# Patient Record
Sex: FEMALE | Race: BLACK OR AFRICAN AMERICAN | Employment: OTHER | URBAN - METROPOLITAN AREA
[De-identification: names, ages, dates, MRNs, and addresses within clinical notes are randomized per-mention and may not be internally consistent; named-entity substitution may affect disease eponyms.]

---

## 2020-12-14 PROBLEM — T84.84XA PAIN DUE TO INTERNAL ORTHOPEDIC PROSTHETIC DEVICE (HCC): Status: ACTIVE | Noted: 2020-12-14

## 2020-12-16 PROBLEM — I10 HTN (HYPERTENSION): Status: ACTIVE | Noted: 2020-12-16

## 2020-12-16 PROBLEM — N60.12: Status: ACTIVE | Noted: 2020-12-16

## 2020-12-16 PROBLEM — E11.9 TYPE 2 DIABETES MELLITUS (HCC): Status: ACTIVE | Noted: 2020-12-16

## 2020-12-16 PROBLEM — E78.5 HLD (HYPERLIPIDEMIA): Status: ACTIVE | Noted: 2020-12-16

## 2020-12-16 PROBLEM — J30.9 ALLERGIC RHINITIS: Status: ACTIVE | Noted: 2020-12-16

## 2020-12-16 PROBLEM — D62 ACUTE BLOOD LOSS ANEMIA: Status: ACTIVE | Noted: 2020-12-16

## 2021-03-13 ENCOUNTER — APPOINTMENT (OUTPATIENT)
Dept: VASCULAR SURGERY | Age: 81
End: 2021-03-13
Attending: PHYSICIAN ASSISTANT
Payer: MEDICARE

## 2021-03-13 ENCOUNTER — HOSPITAL ENCOUNTER (EMERGENCY)
Age: 81
Discharge: HOME | End: 2021-03-13
Attending: EMERGENCY MEDICINE
Payer: MEDICARE

## 2021-03-13 VITALS
RESPIRATION RATE: 20 BRPM | OXYGEN SATURATION: 100 % | SYSTOLIC BLOOD PRESSURE: 135 MMHG | TEMPERATURE: 98.4 F | HEART RATE: 77 BPM | DIASTOLIC BLOOD PRESSURE: 64 MMHG

## 2021-03-13 DIAGNOSIS — M66.0 RUPTURED BAKERS CYST: Primary | ICD-10-CM

## 2021-03-13 LAB
ANION GAP SERPL CALC-SCNC: 8 MMOL/L (ref 3–18)
BASOPHILS # BLD: 0 K/UL (ref 0–0.1)
BASOPHILS NFR BLD: 0 % (ref 0–2)
BUN SERPL-MCNC: 18 MG/DL (ref 7–18)
BUN/CREAT SERPL: 26 (ref 12–20)
CALCIUM SERPL-MCNC: 9.8 MG/DL (ref 8.5–10.1)
CHLORIDE SERPL-SCNC: 104 MMOL/L (ref 100–111)
CO2 SERPL-SCNC: 29 MMOL/L (ref 21–32)
CREAT SERPL-MCNC: 0.68 MG/DL (ref 0.6–1.3)
D DIMER PPP FEU-MCNC: 5.79 UG/ML(FEU)
DIFFERENTIAL METHOD BLD: ABNORMAL
EOSINOPHIL # BLD: 0.1 K/UL (ref 0–0.4)
EOSINOPHIL NFR BLD: 2 % (ref 0–5)
ERYTHROCYTE [DISTWIDTH] IN BLOOD BY AUTOMATED COUNT: 15.3 % (ref 11.6–14.5)
GLUCOSE SERPL-MCNC: 100 MG/DL (ref 74–99)
HCT VFR BLD AUTO: 38.2 % (ref 35–45)
HGB BLD-MCNC: 12.4 G/DL (ref 12–16)
LYMPHOCYTES # BLD: 1.4 K/UL (ref 0.9–3.6)
LYMPHOCYTES NFR BLD: 28 % (ref 21–52)
MCH RBC QN AUTO: 26.1 PG (ref 24–34)
MCHC RBC AUTO-ENTMCNC: 32.5 G/DL (ref 31–37)
MCV RBC AUTO: 80.3 FL (ref 74–97)
MONOCYTES # BLD: 0.6 K/UL (ref 0.05–1.2)
MONOCYTES NFR BLD: 12 % (ref 3–10)
NEUTS SEG # BLD: 2.9 K/UL (ref 1.8–8)
NEUTS SEG NFR BLD: 58 % (ref 40–73)
PLATELET # BLD AUTO: 174 K/UL (ref 135–420)
PMV BLD AUTO: 9.4 FL (ref 9.2–11.8)
POTASSIUM SERPL-SCNC: 3.9 MMOL/L (ref 3.5–5.5)
RBC # BLD AUTO: 4.76 M/UL (ref 4.2–5.3)
SODIUM SERPL-SCNC: 141 MMOL/L (ref 136–145)
WBC # BLD AUTO: 5 K/UL (ref 4.6–13.2)

## 2021-03-13 PROCEDURE — 85379 FIBRIN DEGRADATION QUANT: CPT

## 2021-03-13 PROCEDURE — 93971 EXTREMITY STUDY: CPT

## 2021-03-13 PROCEDURE — 80048 BASIC METABOLIC PNL TOTAL CA: CPT

## 2021-03-13 PROCEDURE — 99284 EMERGENCY DEPT VISIT MOD MDM: CPT

## 2021-03-13 PROCEDURE — 85025 COMPLETE CBC W/AUTO DIFF WBC: CPT

## 2021-03-13 NOTE — ED PROVIDER NOTES
EMERGENCY DEPARTMENT HISTORY AND PHYSICAL EXAM    4:03 PM      Date: 3/13/2021  Patient Name: Du Velarde    History of Presenting Illness     Chief Complaint   Patient presents with    Leg Pain       History Provided By: Patient    Chief Complaint: Right posterior knee pain  Duration:  Weeks  Timing:  Acute  Location:   Quality: Aching  Severity: Moderate  Modifying Factors:   Associated Symptoms: denies any other associated signs or symptoms      Additional History (Context): Du Velarde is a 68 y.o. female with a pertinent history of hypertension, diabetes, hyperlipidemia, GERD who presents from urgent care to the emergency department for evaluation of right leg pain. Patient reports she was sent here to rule out DVT. Patient reports her leg was slightly swollen a few days ago, but the swelling has gone down. No specific injury or trauma. Patient reports pain behind the knee radiates into the right medial thigh as well as down into the right medial calf. No associated fevers or chills, chest pain, shortness of breath, cough, hemoptysis, history of DVT/PE, or any other concerns. Patient is not on any blood thinners. PCP:  Leighton Pittman MD      Current Outpatient Medications   Medication Sig Dispense Refill    aspirin delayed-release 81 mg tablet Take 1 Tab by mouth two (2) times a day. Indications: DVT prophylaxis 60 Tab 0    celecoxib (CELEBREX) 100 mg capsule Take 1 Cap by mouth two (2) times a day for 90 days. Indications: acute pain following an operation 60 Cap 2    methocarbamoL (ROBAXIN) 750 mg tablet Take 1 Tab by mouth three (3) times daily as needed for Muscle Spasm(s). Indications: muscle spasm 90 Tab 0    senna-docusate (PERICOLACE) 8.6-50 mg per tablet Take 1 Tab by mouth two (2) times a day. Indications: constipation 60 Tab 0    meclizine (ANTIVERT) 25 mg tablet Take 25 mg by mouth three (3) times daily as needed for Dizziness.  Indications: sensation of spinning or whirling  traMADoL (ULTRAM) 50 mg tablet Take 100 mg by mouth every six (6) hours as needed.  omega 3-dha-epa-fish oil (Fish Oil) 100-160-1,000 mg cap Take 1 Cap by mouth daily.  SITagliptin (Januvia) 100 mg tablet Take 100 mg by mouth daily.  atorvastatin (Lipitor) 10 mg tablet Take 10 mg by mouth nightly.  potassium chloride (K-DUR, KLOR-CON) 20 mEq tablet Take 20 mEq by mouth daily.  metFORMIN (GLUMETZA) 1,000 mg TG24 24 hour tablet Take 1 Tab by mouth every evening.  atenoloL (TENORMIN) 100 mg tablet Take 100 mg by mouth daily.  amLODIPine-Olmesartan 5-20 mg tab Take 1 Tab by mouth daily.  insulin glargine (Lantus U-100 Insulin) 100 unit/mL injection 30 Units by SubCUTAneous route nightly.  multivitamin-iron-FA, hematinic, (Centrum)  mg-mcg tab tablet Take 1 Tab by mouth daily.  multivitamin (ONE A DAY) tablet Take 1 Tab by mouth daily.  vitamin E (AQUA GEMS) 400 unit capsule Take 400 Units by mouth daily.  pseudoephedrine/acetaminophen (TYLENOL SINUS PO) Take  by mouth as needed.  loratadine (Claritin) 10 mg tablet Take 10 mg by mouth daily as needed for Allergies.  oxymetazoline HCl (AFRIN NASAL SPRAY NA) by Nasal route as needed.          Past History     Past Medical History:  Past Medical History:   Diagnosis Date    Arthritis     Diabetes ([address]     GERD (gastroesophageal reflux disease)     High cholesterol     Hypertension        Past Surgical History:  Past Surgical History:   Procedure Laterality Date    HX CATARACT REMOVAL Bilateral     HX COLONOSCOPY      HX HYSTERECTOMY      age 41   [de-identified] KNEE REPLACEMENT Right 2010    HX LUMBAR LAMINECTOMY  2007    HX OTHER SURGICAL      knee aspiration       Family History:  Family History   Problem Relation Age of Onset    Diabetes Mother     Hypertension Mother        Social History:  Social History     Tobacco Use    Smoking status: Never Smoker    Smokeless tobacco: Never Used Substance Use Topics    Alcohol use: Yes     Frequency: Never     Comment: rarely    Drug use: Never       Allergies:  No Known Allergies      Review of Systems       Review of Systems   Constitutional: Negative for chills and fever. HENT: Negative for congestion, rhinorrhea and sore throat. Respiratory: Negative for cough and shortness of breath. Cardiovascular: Positive for leg swelling. Negative for chest pain. Gastrointestinal: Negative for abdominal pain, blood in stool, constipation, diarrhea, nausea and vomiting. Genitourinary: Negative for dysuria, frequency and hematuria. Musculoskeletal: Positive for myalgias. Negative for back pain. Skin: Negative for rash and wound. Neurological: Negative for dizziness and headaches. All other systems reviewed and are negative. Physical Exam     Visit Vitals  /64   Pulse 77   Temp 98.4 °F (36.9 °C)   Resp 20   SpO2 100%       Physical Exam  Vitals signs and nursing note reviewed. Constitutional:       General: She is not in acute distress. Appearance: She is well-developed. She is not diaphoretic. HENT:      Head: Normocephalic and atraumatic. Nose: No congestion or rhinorrhea. Eyes:      Conjunctiva/sclera: Conjunctivae normal.   Neck:      Musculoskeletal: Normal range of motion and neck supple. Cardiovascular:      Rate and Rhythm: Normal rate and regular rhythm. Heart sounds: Normal heart sounds. Pulmonary:      Effort: Pulmonary effort is normal. No respiratory distress. Breath sounds: Normal breath sounds. No stridor. No wheezing, rhonchi or rales. Comments: Lungs are clear to auscultation bilaterally  Chest:      Chest wall: No tenderness. Musculoskeletal:         General: Tenderness present. No swelling or deformity. Skin:     General: Skin is warm and dry. Neurological:      Mental Status: She is alert and oriented to person, place, and time.       Deep Tendon Reflexes: Reflexes are normal and symmetric. Diagnostic Study Results     Labs -  Recent Results (from the past 12 hour(s))   METABOLIC PANEL, BASIC    Collection Time: 03/13/21  1:42 PM   Result Value Ref Range    Sodium 141 136 - 145 mmol/L    Potassium 3.9 3.5 - 5.5 mmol/L    Chloride 104 100 - 111 mmol/L    CO2 29 21 - 32 mmol/L    Anion gap 8 3.0 - 18 mmol/L    Glucose 100 (H) 74 - 99 mg/dL    BUN 18 7.0 - 18 MG/DL    Creatinine 0.68 0.6 - 1.3 MG/DL    BUN/Creatinine ratio 26 (H) 12 - 20      GFR est AA >60 >60 ml/min/1.73m2    GFR est non-AA >60 >60 ml/min/1.73m2    Calcium 9.8 8.5 - 10.1 MG/DL   D DIMER    Collection Time: 03/13/21  1:42 PM   Result Value Ref Range    D DIMER 5.79 (H) <0.46 ug/ml(FEU)   CBC WITH AUTOMATED DIFF    Collection Time: 03/13/21  3:05 PM   Result Value Ref Range    WBC 5.0 4.6 - 13.2 K/uL    RBC 4.76 4.20 - 5.30 M/uL    HGB 12.4 12.0 - 16.0 g/dL    HCT 38.2 35.0 - 45.0 %    MCV 80.3 74.0 - 97.0 FL    MCH 26.1 24.0 - 34.0 PG    MCHC 32.5 31.0 - 37.0 g/dL    RDW 15.3 (H) 11.6 - 14.5 %    PLATELET 165 465 - 721 K/uL    MPV 9.4 9.2 - 11.8 FL    NEUTROPHILS 58 40 - 73 %    LYMPHOCYTES 28 21 - 52 %    MONOCYTES 12 (H) 3 - 10 %    EOSINOPHILS 2 0 - 5 %    BASOPHILS 0 0 - 2 %    ABS. NEUTROPHILS 2.9 1.8 - 8.0 K/UL    ABS. LYMPHOCYTES 1.4 0.9 - 3.6 K/UL    ABS. MONOCYTES 0.6 0.05 - 1.2 K/UL    ABS. EOSINOPHILS 0.1 0.0 - 0.4 K/UL    ABS. BASOPHILS 0.0 0.0 - 0.1 K/UL    DF AUTOMATED         Radiologic Studies -   No results found. Medical Decision Making   I am the first provider for this patient. I reviewed the vital signs, available nursing notes, past medical history, past surgical history, family history and social history. Vital Signs-Reviewed the patient's vital signs.     Pulse Oximetry Analysis -  100% on room air (Interpretation)    Records Reviewed: Nursing Notes and Old Medical Records (Time of Review: 4:03 PM)    ED Course: Progress Notes, Reevaluation, and Consults:    Provider Notes (Medical Decision Making):   differential diagnosis: Bursitis, Baker's cyst, DVT, myalgia    Plan: Patient presents ambulatory in no significant distress with normal vitals. Exam concerning for DVT. D-dimer is elevated. Otherwise unremarkable CBC and CMP. Duplex study is negative for DVT, but does demonstrate nonvascular, hypoechoic region which could correlate with a ruptured Baker's cyst or muscular injury. Patient denies any recent trauma. Advised on the use of OTC Tylenol as needed and gentle stretching and range of motion exercises as well as warm compresses. At this time, patient is stable and appropriate for discharge home. Patient demonstrates understanding of current diagnoses and is in agreement with the treatment plan. They are advised that while the likelihood of serious underlying condition is low at this point given the evaluation performed today, we cannot fully rule it out. They are advised to immediately return with any new symptoms or worsening of current condition. All questions have been answered. Patient is given educational material regarding their diagnoses, including danger symptoms and when to return to the ED. Diagnosis     Clinical Impression:   1. Ruptured Bakers cyst        Disposition: DC Home    Follow-up Information     Follow up With Specialties Details Why [address] [facility]  Call  For follow-up [address]  [address]    [facility] EMERGENCY DEPT Emergency Medicine Go to As needed [address]  [phone]           Patient's Medications   Start Taking    No medications on file   Continue Taking    AMLODIPINE-OLMESARTAN 5-20 MG TAB    Take 1 Tab by mouth daily. ASPIRIN DELAYED-RELEASE 81 MG TABLET    Take 1 Tab by mouth two (2) times a day. Indications: DVT prophylaxis    ATENOLOL (TENORMIN) 100 MG TABLET    Take 100 mg by mouth daily.     ATORVASTATIN (LIPITOR) 10 MG TABLET    Take 10 mg by mouth nightly. CELECOXIB (CELEBREX) 100 MG CAPSULE    Take 1 Cap by mouth two (2) times a day for 90 days. Indications: acute pain following an operation    INSULIN GLARGINE (LANTUS U-100 INSULIN) 100 UNIT/ML INJECTION    30 Units by SubCUTAneous route nightly. LORATADINE (CLARITIN) 10 MG TABLET    Take 10 mg by mouth daily as needed for Allergies. MECLIZINE (ANTIVERT) 25 MG TABLET    Take 25 mg by mouth three (3) times daily as needed for Dizziness. Indications: sensation of spinning or whirling    METFORMIN (GLUMETZA) 1,000 MG TG24 24 HOUR TABLET    Take 1 Tab by mouth every evening. METHOCARBAMOL (ROBAXIN) 750 MG TABLET    Take 1 Tab by mouth three (3) times daily as needed for Muscle Spasm(s). Indications: muscle spasm    MULTIVITAMIN (ONE A DAY) TABLET    Take 1 Tab by mouth daily. MULTIVITAMIN-IRON-FA, HEMATINIC, (CENTRUM)  MG-MCG TAB TABLET    Take 1 Tab by mouth daily. OMEGA 3-DHA-EPA-FISH OIL (FISH OIL) 100-160-1,000 MG CAP    Take 1 Cap by mouth daily. OXYMETAZOLINE HCL (AFRIN NASAL SPRAY NA)    by Nasal route as needed. POTASSIUM CHLORIDE (K-DUR, KLOR-CON) 20 MEQ TABLET    Take 20 mEq by mouth daily. PSEUDOEPHEDRINE/ACETAMINOPHEN (TYLENOL SINUS PO)    Take  by mouth as needed. SENNA-DOCUSATE (PERICOLACE) 8.6-50 MG PER TABLET    Take 1 Tab by mouth two (2) times a day. Indications: constipation    SITAGLIPTIN (JANUVIA) 100 MG TABLET    Take 100 mg by mouth daily. TRAMADOL (ULTRAM) 50 MG TABLET    Take 100 mg by mouth every six (6) hours as needed. VITAMIN E (AQUA GEMS) 400 UNIT CAPSULE    Take 400 Units by mouth daily. These Medications have changed    No medications on file   Stop Taking    No medications on file     _______________________________    This note was dictated utilizing voice recognition software which may lead to typographical errors. I apologize in advance if the situation occurs.   If questions arise please do not hesitate to contact me or call our department.   Tyesha Meadows PA-C

## 2021-03-13 NOTE — ED NOTES
Blood work collected and taken to lab. Lab said blood clotted and need to be recollected. Nurse ERIN made aware.

## 2022-03-18 PROBLEM — E11.9 TYPE 2 DIABETES MELLITUS (HCC): Status: ACTIVE | Noted: 2020-12-16

## 2022-03-18 PROBLEM — T84.84XA PAIN DUE TO INTERNAL ORTHOPEDIC PROSTHETIC DEVICE (HCC): Status: ACTIVE | Noted: 2020-12-14

## 2022-03-18 PROBLEM — D62 ACUTE BLOOD LOSS ANEMIA: Status: ACTIVE | Noted: 2020-12-16

## 2022-03-18 PROBLEM — N60.12: Status: ACTIVE | Noted: 2020-12-16

## 2022-03-19 PROBLEM — J30.9 ALLERGIC RHINITIS: Status: ACTIVE | Noted: 2020-12-16

## 2022-03-19 PROBLEM — I10 HTN (HYPERTENSION): Status: ACTIVE | Noted: 2020-12-16

## 2022-03-20 PROBLEM — E78.5 HLD (HYPERLIPIDEMIA): Status: ACTIVE | Noted: 2020-12-16

## 2024-07-26 ENCOUNTER — HOSPITAL ENCOUNTER (OUTPATIENT)
Facility: HOSPITAL | Age: 84
Setting detail: RECURRING SERIES
Discharge: HOME | End: 2024-07-29
Payer: MEDICARE

## 2024-07-26 PROCEDURE — 97110 THERAPEUTIC EXERCISES: CPT

## 2024-07-26 PROCEDURE — 97161 PT EVAL LOW COMPLEX 20 MIN: CPT

## 2024-07-26 NOTE — PROGRESS NOTES
[facility]  [address] [place] [address] Ph:[phone] Fx: [phone]  Plan of Care / Statement of Necessity for Physical Therapy Services     Patient Name: Harriett Powers : 1940   Medical   Diagnosis: Right knee pain [M25.561] Treatment Diagnosis: M25.561  RIGHT KNEE PAIN and M25.562  LEFT KNEE PAIN  and M54.59  OTHER LOWER BACK PAIN      Onset Date:  Payor :  Payor: MEDICARE / Plan: MEDICARE PART A AND B / Product Type: *No Product type* /    Referral Source: Shilpa Gipson, BRENDON -* Start of Care (SOC): 2024   Prior Hospitalization: See medical history Provider #: [id number]   Prior Level of Function: Ind with ambulation, Ind with ADLs, gardening   Comorbidities:  Diabetes mellitus and Musculoskeletal disorders     Assessment / key information:    Pt. Is an 83 year old female c/o B knee pain and back pain. She reports symptoms have gradually worsened over time and she had a right TKA revision in . She reports her biggest concern currently is her knees buckling occasionally and feeling unstable. She has more instability with initial standing after prolonged sitting. She also feels off balance on uneven surfaces. She has increased pain and difficulty with stairs, bending, and lifting. She has good knee AROM left: 0-121 degrees right: 1-122 degrees. She has decreased B hip strength ext: B: 3/5 abd: B: 4-/5 add B: 3/5. She also has decreased right knee strength ext: 4+/5 flex: 4/5. Decreased B hamstring and quad flexibility. She has decreased B single leg balance at 2 seconds each. 30 second sit to stand test was 8x. Skilled PT is medically necessary in order to improve B LE strength, flexibility, and core stability for increased ease of ADLs and improved quality of life.     Evaluation Complexity:  History:  MEDIUM  Complexity : 1-2 comorbidities / personal factors will impact the outcome/ POC ; Examination:  MEDIUM Complexity 
HEP          Details if applicable:            Details if applicable:            Details if applicable:            Details if applicable:     Total  8 Total Reminder: MC/BC bill using total billable min of TIMED therapeutic procedures (example: do not include dry needle or estim unattended, both untimed codes, in totals to left)     [x]  Patient Education billed concurrently with other procedures     Physical Therapy Evaluation - Knee    ROM / Strength  [] Unable to assess                  AROM                      PROM                   Strength (1-5)    Left Right Left Right Left Right   Hip Flexion     4 4    Extension     3 3    Abduction     4- 4-    Adduction     3 3   Knee Flexion 121 122   4+ 4    Extension 0 1   5 4+   Ankle Plantarflexion     3 2+    Dorsiflexion     4 4       Flexibility: [] Unable to assess at this time  Hamstrings:    (L) Tightness= [] WNL   [] Min   [x] Mod   [] Severe    (R) Tightness= [] WNL   [] Min   [x] Mod   [] Severe  Quadriceps:    (L) Tightness= [] WNL   [] Min   [x] Mod   [] Severe    (R) Tightness= [] WNL   [] Min   [x] Mod   [] Severe  Gastroc:      (L) Tightness= [] WNL   [] Min   [] Mod   [] Severe    (R) Tightness= [] WNL   [] Min   [] Mod   [] Severe  Other:      Other tests/comments:  Lumbar AROM: flex: 100% ext: 10 degrees SB: 50%   SLS: B: 2 seconds  30 second sit to stand test: 8x    Pain Level (0-10 scale) post treatment: 0/10       [x]  See Plan of Care for goals and reassessment       PLAN  []  Upgrade activities as tolerated     [x]  Continue plan of care  []  Update interventions per flow sheet       []  Other:_      David Aguilar, PT 7/26/2024  6:45 AM

## 2024-07-31 ENCOUNTER — HOSPITAL ENCOUNTER (OUTPATIENT)
Facility: HOSPITAL | Age: 84
Setting detail: RECURRING SERIES
Discharge: HOME | End: 2024-08-03
Payer: MEDICARE

## 2024-07-31 PROCEDURE — 97530 THERAPEUTIC ACTIVITIES: CPT

## 2024-07-31 PROCEDURE — 97110 THERAPEUTIC EXERCISES: CPT

## 2024-07-31 PROCEDURE — 97112 NEUROMUSCULAR REEDUCATION: CPT

## 2024-07-31 NOTE — PROGRESS NOTES
PHYSICAL / OCCUPATIONAL THERAPY - DAILY TREATMENT NOTE    Patient Name: Harriett Powers    Date: 2024    : 1940  Insurance: Payor: MEDICARE / Plan: MEDICARE PART A AND B / Product Type: *No Product type* /      Patient  verified yes   Visit #   Current / Total 2 24   Time   In / Out 11:23A 12:03P   Pain   In / Out 0/10 0/10   Subjective Functional Status/Changes: Patient reports she has been ok since evaluation. She tried the exercises and used muscles she did not know she had. She had her granddaughter try to help. Her gait is terrible especially when she first wakes up; it depends on what she did the day before. If she leans too far to the right she may go over; she feels the right ankle does not have strength. She suffers from vertigo but it is not all the time. She feels her right leg is shorter than her left since surgery.      TREATMENT AREA =  Right knee pain [M25.561]  Left knee pain [M25.562]  Other low back pain [M54.59]     OBJECTIVE      Therapeutic Procedures:    Tx Min Billable or 1:1 Min (if diff from Tx Min) Procedure, Rationale, Specifics   16  54855 Therapeutic Exercise (timed):  increase ROM, strength, coordination, balance, and proprioception to improve patient's ability to progress to PLOF and address remaining functional goals. (see flow sheet as applicable)     Details if applicable:  review of SLS with fingertip support for home     11  76869 Neuromuscular Re-Education (timed):  improve balance, coordination, kinesthetic sense, posture, core stability and proprioception to improve patient's ability to develop conscious control of individual muscles and awareness of position of extremities in order to progress to PLOF and address remaining functional goals. (see flow sheet as applicable)     Details if applicable:  standing balance   13  03933 Therapeutic Activity (timed):  use of dynamic activities replicating functional movements to increase ROM, strength, coordination,

## 2024-08-06 ENCOUNTER — HOSPITAL ENCOUNTER (OUTPATIENT)
Facility: HOSPITAL | Age: 84
Setting detail: RECURRING SERIES
Discharge: HOME | End: 2024-08-09
Payer: MEDICARE

## 2024-08-06 PROCEDURE — 97530 THERAPEUTIC ACTIVITIES: CPT

## 2024-08-06 PROCEDURE — 97110 THERAPEUTIC EXERCISES: CPT

## 2024-08-06 PROCEDURE — 97112 NEUROMUSCULAR REEDUCATION: CPT

## 2024-08-06 NOTE — PROGRESS NOTES
PHYSICAL / OCCUPATIONAL THERAPY - DAILY TREATMENT NOTE    Patient Name: Harriett Powers    Date: 2024    : 1940  Insurance: Payor: MEDICARE / Plan: MEDICARE PART A AND B / Product Type: *No Product type* /      Patient  verified yes   Visit #   Current / Total 3 24   Time   In / Out 10:46 11:24   Pain   In / Out 0 (stiff) 0   Subjective Functional Status/Changes: walking balance is a little better after addition of heel lift at the LV  Carpet cleaning yesterday, increasing back pain  A lot of the unsteadiness is in my ankles     TREATMENT AREA =  Right knee pain [M25.561]  Left knee pain [M25.562]  Other low back pain [M54.59]     OBJECTIVE      Therapeutic Procedures:    Tx Min Billable or 1:1 Min (if diff from Tx Min) Procedure, Rationale, Specifics   14  09330 Therapeutic Exercise (timed):  increase ROM, strength, coordination, balance, and proprioception to improve patient's ability to progress to PLOF and address remaining functional goals. (see flow sheet as applicable)     Details if applicable:  review of SLS with fingertip support for home     12  81029 Neuromuscular Re-Education (timed):  improve balance, coordination, kinesthetic sense, posture, core stability and proprioception to improve patient's ability to develop conscious control of individual muscles and awareness of position of extremities in order to progress to PLOF and address remaining functional goals. (see flow sheet as applicable)     Details if applicable:  standing balance   12  81230 Therapeutic Activity (timed):  use of dynamic activities replicating functional movements to increase ROM, strength, coordination, balance, and proprioception in order to improve patient's ability to progress to PLOF and address remaining functional goals.  (see flow sheet as applicable)     Details if applicable:    38  [facility] Totals Reminder: bill using total billable min of TIMED therapeutic procedures (example: do not include dry needle or

## 2024-08-08 ENCOUNTER — HOSPITAL ENCOUNTER (OUTPATIENT)
Facility: HOSPITAL | Age: 84
Setting detail: RECURRING SERIES
Discharge: HOME | End: 2024-08-11
Payer: MEDICARE

## 2024-08-08 PROCEDURE — 97112 NEUROMUSCULAR REEDUCATION: CPT

## 2024-08-08 PROCEDURE — 97110 THERAPEUTIC EXERCISES: CPT

## 2024-08-08 NOTE — PROGRESS NOTES
PHYSICAL / OCCUPATIONAL THERAPY - DAILY TREATMENT NOTE    Patient Name: Harriett Powers    Date: 2024    : 1940  Insurance: Payor: MEDICARE / Plan: MEDICARE PART A AND B / Product Type: *No Product type* /      Patient  verified Yes     Visit #   Current / Total 4 24   Time   In / Out 12:00 12:38   Pain   In / Out 0/10 0/10   Subjective Functional Status/Changes: Pt. Reports she is doing pretty good today. She reports she is unable to hold her balance at home.      TREATMENT AREA =  Right knee pain [M25.561]  Left knee pain [M25.562]  Other low back pain [M54.59]     OBJECTIVE    Therapeutic Procedures:    Tx Min Billable or 1:1 Min (if diff from Tx Min) Procedure, Rationale, Specifics   30  03788 Therapeutic Exercise (timed):  increase ROM, strength, coordination, balance, and proprioception to improve patient's ability to progress to PLOF and address remaining functional goals. (see flow sheet as applicable)     Details if applicable:  see flow sheet     8  50534 Neuromuscular Re-Education (timed):  improve balance, coordination, kinesthetic sense, posture, core stability and proprioception to improve patient's ability to develop conscious control of individual muscles and awareness of position of extremities in order to progress to PLOF and address remaining functional goals. (see flow sheet as applicable)     Details if applicable:  standing balance activities           Details if applicable:            Details if applicable:            Details if applicable:     38  [facility] Totals Reminder: bill using total billable min of TIMED therapeutic procedures (example: do not include dry needle or estim unattended, both untimed codes, in totals to left)  8-22 min = 1 unit; 23-37 min = 2 units; 38-52 min = 3 units; 53-67 min = 4 units; 68-82 min = 5 units   Total Total     [x]  Patient Education billed concurrently with other procedures   [x] Review HEP    [] Progressed/Changed HEP, detail:    [] Other

## 2024-08-13 ENCOUNTER — HOSPITAL ENCOUNTER (OUTPATIENT)
Facility: HOSPITAL | Age: 84
Setting detail: RECURRING SERIES
Discharge: HOME | End: 2024-08-16
Payer: MEDICARE

## 2024-08-13 PROCEDURE — 97530 THERAPEUTIC ACTIVITIES: CPT

## 2024-08-13 PROCEDURE — 97110 THERAPEUTIC EXERCISES: CPT

## 2024-08-13 NOTE — PROGRESS NOTES
MMT to 4/5 in order to increase ease of ambulation.   Status at evaluation/last progress note:     4.   Goal: Patient will improve 30 second sit to stand test to 10x in order to increase ease of transfers at home.   Status at evaluation/last progress note: 8x     5.   Goal: Patient will improve B single leg balance to 10 seconds in order to improve stability during ambulation.   Status at evaluation/last progress note: B: 2 seconds  Current: 8/6/2024, reports improved walking balance with recently provided heel lift        Next PN 8/24/24/ RC due 10/24/24  Auth due (visit number/ date) DAWSON    PLAN  - Continue Plan of Care    David Aguilar, PT    8/13/2024    7:39 AM  If an interpreting service was utilized for treatment of this patient, the contents of this document represent the material reviewed with the patient via the .     Future Appointments   Date Time Provider Department Center   8/13/2024 12:40 PM David Aguilar, PT [facility]   8/15/2024 10:00 AM [facility] PT [address] [facility]   8/20/2024 10:40 AM Loree Gilliam, PTA [facility]   8/22/2024 10:00 AM [facility] PT [address] [facility]   8/27/2024 11:20 AM David Aguilar, PT [facility]   8/29/2024 11:20 AM David Aguilar, PT [facility]   9/3/2024 11:20 AM Davdi Aguilar, PT [facility]   9/6/2024 10:00 AM Loree Gilliam, PTA [facility]   9/10/2024 10:00 AM Loree Gilliam, PTA [facility]   9/12/2024 10:00 AM Loree Gilliam, PTA [facility]   9/17/2024 10:00 AM Loree Gilliam, PTA [facility]   9/19/2024 10:40 AM David Aguilar, PT [facility]

## 2024-08-15 ENCOUNTER — HOSPITAL ENCOUNTER (OUTPATIENT)
Facility: HOSPITAL | Age: 84
Setting detail: RECURRING SERIES
Discharge: HOME | End: 2024-08-18
Payer: MEDICARE

## 2024-08-15 PROCEDURE — 97112 NEUROMUSCULAR REEDUCATION: CPT

## 2024-08-15 PROCEDURE — 97530 THERAPEUTIC ACTIVITIES: CPT

## 2024-08-15 PROCEDURE — 97110 THERAPEUTIC EXERCISES: CPT

## 2024-08-15 NOTE — PROGRESS NOTES
PHYSICAL / OCCUPATIONAL THERAPY - DAILY TREATMENT NOTE    Patient Name: Harriett Powers    Date: 8/15/2024    : 1940  Insurance: Payor: MEDICARE / Plan: MEDICARE PART A AND B / Product Type: *No Product type* /      Patient  verified Yes     Visit #   Current / Total 6 24   Time   In / Out 10:00 (10:03)  10:43   Pain   In / Out 2 0   Subjective Functional Status/Changes: Probably some stretches I was doing, sometimes it acts up just because     TREATMENT AREA =  Right knee pain [M25.561]  Left knee pain [M25.562]  Other low back pain [M54.59]     OBJECTIVE    Therapeutic Procedures:    Tx Min Billable or 1:1 Min (if diff from Tx Min) Procedure, Rationale, Specifics   15 15 51131 Therapeutic Exercise (timed):  increase ROM, strength, coordination, balance, and proprioception to improve patient's ability to progress to PLOF and address remaining functional goals. (see flow sheet as applicable)     Details if applicable:  see flow sheet     15 43 15996 Therapeutic Activity (timed):  use of dynamic activities replicating functional movements to increase ROM, strength, coordination, balance, and proprioception in order to improve patient's ability to progress to PLOF and address remaining functional goals.  (see flow sheet as applicable)     Details if applicable:  step ups, tandem balance    13 10 50694 Neuromuscular Re-Education (timed):  improve balance, coordination, kinesthetic sense, posture, core stability and proprioception to improve patient's ability to develop conscious control of individual muscles and awareness of position of extremities in order to progress to PLOF and address remaining functional goals. (see flow sheet as applicable)        Details if applicable:  tandem stance, foam with EC          Details if applicable:            Details if applicable:     43 40 [facility] Totals Reminder: bill using total billable min of TIMED therapeutic procedures (example: do not include dry needle or estim

## 2024-08-20 ENCOUNTER — HOSPITAL ENCOUNTER (OUTPATIENT)
Facility: HOSPITAL | Age: 84
Setting detail: RECURRING SERIES
Discharge: HOME | End: 2024-08-23
Payer: MEDICARE

## 2024-08-20 PROCEDURE — 97110 THERAPEUTIC EXERCISES: CPT

## 2024-08-20 NOTE — PROGRESS NOTES
evaluation/last progress note: 4+/5     3.   Goal: Patient will improve B hip extension MMT to 4/5 in order to increase ease of ambulation.   Status at evaluation/last progress note:     4.   Goal: Patient will improve 30 second sit to stand test to 10x in order to increase ease of transfers at home.   Status at evaluation/last progress note: 8x  Current: 8/15/2024, 6 reps with reports of stiffness throughout body     5.   Goal: Patient will improve B single leg balance to 10 seconds in order to improve stability during ambulation.   Status at evaluation/last progress note: B: 2 seconds  Current: 8/15/2024, progressing - R = 4\" avg of 3 , L= 3\" avg of 3        Next PN 8/24/24/ RC due 10/24/24  Auth due (visit number/ date) DAWSON    PLAN  - Continue Plan of Care  - Upgrade activities as tolerated    Loree Gilliam PTA    8/20/2024    6:26 AM  If an interpreting service was utilized for treatment of this patient, the contents of this document represent the material reviewed with the patient via the .     Future Appointments   Date Time Provider Department Center   8/20/2024 10:40 AM Loree Gilliam PTA [facility]   8/22/2024 10:00 AM [facility] PT [address] [facility]   8/27/2024 11:20 AM David Aguilar, PT [facility]   8/29/2024 11:20 AM David Aguilar, PT [facility]   9/3/2024 11:20 AM David Aguilar, PT [facility]   9/6/2024 10:00 AM Loree Gilliam PTA [facility]   9/10/2024 10:00 AM Loree Gilliam PTA [facility]   9/12/2024 10:00 AM Loree Gilliam PTA [facility]   9/17/2024 10:00 AM Loree Gilliam PTA [facility]   9/19/2024 10:40 AM David Aguilar, PT [facility]

## 2024-08-22 ENCOUNTER — HOSPITAL ENCOUNTER (OUTPATIENT)
Facility: HOSPITAL | Age: 84
Setting detail: RECURRING SERIES
Discharge: HOME | End: 2024-08-25
Payer: MEDICARE

## 2024-08-22 PROCEDURE — 97112 NEUROMUSCULAR REEDUCATION: CPT

## 2024-08-22 PROCEDURE — 97530 THERAPEUTIC ACTIVITIES: CPT

## 2024-08-22 PROCEDURE — 97110 THERAPEUTIC EXERCISES: CPT

## 2024-08-22 NOTE — PROGRESS NOTES
[facility]  [address] [place] [address] - Ph: [phone]   Fx: [phone]  PHYSICAL THERAPY PROGRESS NOTE  [x] Progress Note  [] Discharge Summary    Patient Name: Harriett Powers : 1940   Treatment/Medical Diagnosis: Right knee pain [M25.561]  Left knee pain [M25.562]  Other low back pain [M54.59]   Referral Source: Shilpa Gipson, APRN -*     Date of Initial Visit: 2024 Attended Visits: 8 Missed Visits: 0       Comorbidities: Diabetes mellitus and Musculoskeletal disorders   Prior Level of Function:Ind with ambulation, Ind with ADLs, gardening     SUMMARY OF TREATMENT  Pt attending 8 visits since their SOC for R knee pain on 2024.  They report 50% improvement since this time with a 2-3/10 max pain level during after awakening, decreasing with movement during day.  Lowest reported pain level is 0/10.  Functional improvements of B LE strength, SL balance, and increased functional outcome measure are noted, though they remain to report/demonstrate difficulty with prolonged SL stance, ascending stair navigation, and standing balance with changes in direction during house care.     They will benefit from further skilled therapy to improve B LE functional strength, balance, and walking/standing tolerance for increased ease during stair navigation and balance during ambulation for decreased risk of falls at home and outside in community.     CURRENT STATUS/Progress towards Goals:  Short Term Goals: To be accomplished in 4  weeks  Goal: Patient will demonstrate compliance with HEP in order to improve B knee mobility for increased ease of ADLs   Status at evaluation/last progress note: n/a  Current PN: goal MET, reports current compliant     2.   Goal: Patient will improve B single leg balance to 5 seconds in order to increase ease of ambulation.   Status at evaluation/last progress note: 2 seconds each  Current PN: goal MET,  R= 6\" and L= 7\"     Long Term Goals: To be accomplished in 12 weeks  Goal: Patient will improve The Lower Extremity Functional Scale by 9 points in order to demonstrate a significant improvement in daily activities.    Status at evaluation/last progress note: 55/80  Current PN: slight progression: 56/80     2.   Goal: Patient will improve right knee extension MMT to 5/5 in order to increase ease of climbing stairs.   Status at evaluation/last progress note: 4+/5  Current PN: no progress, 4/5     3.   Goal: Patient will improve B hip extension MMT to 4/5 in order to increase ease of ambulation.   Status at evaluation/last progress note: B hip ext MMT 3/5  Current PN: progressing, B hip ext MMT 4/5     4.   Goal: Patient will improve 30 second sit to stand test to 10x in order to increase ease of transfers at home.   Status at evaluation/last progress note: 8x  Current PN: progressing, completing 9 reps     5.   Goal: Patient will improve B single leg balance to 10 seconds in order to improve stability during ambulation.   Status at evaluation/last progress note: B: 2 seconds  Current PN: progressing, R= 6\" and L= 7\"      Medicare, cannot change goals, cannot adjust frequency/duration, no signature required   Reporting Period: (date from last Prog Note/Eval to current Prog Note/Recert)  07/26/2024 - 08/22/2024    RECOMMENDATIONS  Patient would benefit from the continuation of skilled rehab interventions, per initial Plan of Care or most recent Medicare Recert, for functional progress to achieving above stated clinically significant goals.    If you have any questions/comments please contact us directly.  Thank you for allowing us to assist in the care of your patient.    JUANITA BOWENS, PTA       8/22/2024       3:49 PM

## 2024-08-22 NOTE — PROGRESS NOTES
PHYSICAL / OCCUPATIONAL THERAPY - DAILY TREATMENT NOTE    Patient Name: Harriett Powers    Date: 2024    : 1940  Insurance: Payor: MEDICARE / Plan: MEDICARE PART A AND B / Product Type: *No Product type* /      Patient  verified Yes     Visit #   Current / Total 8 24   Time   In / Out 10:01 1039   Pain   In / Out 0/10 0/10   Subjective Functional Status/Changes: Pain at best 0/10, at worst 2-3/10, in morning when first start walking  Subjective % improvement 50%  Pt goals: strengthening R ankle, feeling weak  \"I guess a little progress\"     TREATMENT AREA =  Right knee pain [M25.561]  Left knee pain [M25.562]  Other low back pain [M54.59]     OBJECTIVE         Therapeutic Procedures:    Tx Min Billable or 1:1 Min (if diff from Tx Min) Procedure, Rationale, Specifics   15  85931 Therapeutic Exercise (timed):  increase ROM, strength, coordination, balance, and proprioception to improve patient's ability to progress to PLOF and address remaining functional goals. (see flow sheet as applicable)     Details if applicable:       15  33488 Neuromuscular Re-Education (timed):  improve balance, coordination, kinesthetic sense, posture, core stability and proprioception to improve patient's ability to develop conscious control of individual muscles and awareness of position of extremities in order to progress to PLOF and address remaining functional goals. (see flow sheet as applicable)     Details if applicable:     8  98416 Therapeutic Activity (timed):  use of dynamic activities replicating functional movements to increase ROM, strength, coordination, balance, and proprioception in order to improve patient's ability to progress to PLOF and address remaining functional goals.  (see flow sheet as applicable)     Details if applicable:     38 38 [facility] Totals Reminder: bill using total billable min of TIMED therapeutic procedures (example: do not include dry needle or estim unattended, both untimed codes, in  interventions, analyze and address functional mobility deficits, analyze and address ROM deficits, analyze and address strength deficits, analyze and cue for proper movement patterns, analyze and modify for postural abnormalities, analyze and address imbalance/dizziness, and instruct in home and community integration to address functional deficits and attain remaining goals.    Progress toward goals / Updated goals:  [x]  See Progress Note/Recert  Short Term Goals: To be accomplished in 4  weeks  Goal: Patient will demonstrate compliance with HEP in order to improve B knee mobility for increased ease of ADLs   Status at last progress note: goal MET, reports current compliant     2.   Goal: Patient will improve B single leg balance to 5 seconds in order to increase ease of ambulation.   Status at last progress note: goal MET, R= 6\" and L= 7\"     Long Term Goals: To be accomplished in 12 weeks  Goal: Patient will improve The Lower Extremity Functional Scale by 9 points in order to demonstrate a significant improvement in daily activities.    Status at last progress note: slight progression: 56/80     2.   Goal: Patient will improve right knee extension MMT to 5/5 in order to increase ease of climbing stairs.   Status at last progress note: no progress, 4/5    3.   Goal: Patient will improve B hip extension MMT to 4/5 in order to increase ease of ambulation.   Status at last progress note: progressing, B hip ext MMT 4/5     4.   Goal: Patient will improve 30 second sit to stand test to 10x in order to increase ease of transfers at home.   Status at last progress note: progressing, completing 9 reps     5.   Goal: Patient will improve B single leg balance to 10 seconds in order to improve stability during ambulation.   Status at last progress note: progressing, R= 6\" and L= 7\"        Next PN 8/24/24/ RC due 10/24/24  Auth due (visit number/ date) DAWSON    PLAN  - Continue Plan of Care  - Upgrade activities as

## 2024-08-27 ENCOUNTER — HOSPITAL ENCOUNTER (OUTPATIENT)
Facility: HOSPITAL | Age: 84
Setting detail: RECURRING SERIES
Discharge: HOME | End: 2024-08-30
Payer: MEDICARE

## 2024-08-27 PROCEDURE — 97110 THERAPEUTIC EXERCISES: CPT

## 2024-08-27 PROCEDURE — 97530 THERAPEUTIC ACTIVITIES: CPT

## 2024-08-27 PROCEDURE — 97112 NEUROMUSCULAR REEDUCATION: CPT

## 2024-08-27 NOTE — PROGRESS NOTES
PHYSICAL / OCCUPATIONAL THERAPY - DAILY TREATMENT NOTE    Patient Name: Harriett Powers    Date: 2024    : 1940  Insurance: Payor: MEDICARE / Plan: MEDICARE PART A AND B / Product Type: *No Product type* /      Patient  verified Yes     Visit #   Current / Total 9 24   Time   In / Out 11:20 11:59   Pain   In / Out 1/10 0/10   Subjective Functional Status/Changes: Pt. Reports she is doing pretty good today. She reports she continues to have a lot of pain and stiffness in the morning.      TREATMENT AREA =  Right knee pain [M25.561]  Left knee pain [M25.562]  Other low back pain [M54.59]     OBJECTIVE    Therapeutic Procedures:    Tx Min Billable or 1:1 Min (if diff from Tx Min) Procedure, Rationale, Specifics   22  00037 Therapeutic Exercise (timed):  increase ROM, strength, coordination, balance, and proprioception to improve patient's ability to progress to PLOF and address remaining functional goals. (see flow sheet as applicable)     Details if applicable:  see flow sheet     9  84402 Neuromuscular Re-Education (timed):  improve balance, coordination, kinesthetic sense, posture, core stability and proprioception to improve patient's ability to develop conscious control of individual muscles and awareness of position of extremities in order to progress to PLOF and address remaining functional goals. (see flow sheet as applicable)     Details if applicable:  standing balance activities    8  95145 Therapeutic Activity (timed):  use of dynamic activities replicating functional movements to increase ROM, strength, coordination, balance, and proprioception in order to improve patient's ability to progress to PLOF and address remaining functional goals.  (see flow sheet as applicable)     Details if applicable:  step ups, eccentric step downs          Details if applicable:            Details if applicable:     39  [facility] Totals Reminder: bill using total billable min of TIMED therapeutic procedures  significant improvement in daily activities.    Status at last progress note: slight progression: 56/80     2.   Goal: Patient will improve right knee extension MMT to 5/5 in order to increase ease of climbing stairs.   Status at last progress note: no progress, 4/5     3.   Goal: Patient will improve B hip extension MMT to 4/5 in order to increase ease of ambulation.   Status at last progress note: progressing, B hip ext MMT 4/5     4.   Goal: Patient will improve 30 second sit to stand test to 10x in order to increase ease of transfers at home.   Status at last progress note: progressing, completing 9 reps     5.   Goal: Patient will improve B single leg balance to 10 seconds in order to improve stability during ambulation.   Status at last progress note: progressing, R= 6\" and L= 7\"        Next PN 9/21/24/ RC due 10/24/24  Auth due (visit number/ date) DAWSON    PLAN  - Continue Plan of Care    David Aguilar PT    8/27/2024    7:42 AM  If an interpreting service was utilized for treatment of this patient, the contents of this document represent the material reviewed with the patient via the .     Future Appointments   Date Time Provider Department Center   8/27/2024 11:20 AM David Aguilar PT [facility]   8/29/2024 11:20 AM David Aguilar PT [facility]   9/3/2024  9:20 AM Loree Gilliam, PTA [facility]   9/6/2024 10:00 AM Loree Gilliam, PTA [facility]   9/10/2024 10:00 AM Loree Gilliam, PTA [facility]   9/12/2024 10:00 AM Loree Gilliam, PTA [facility]   9/17/2024 10:00 AM Loree Gilliam, PTA [facility]   9/19/2024 10:40 AM David Aguilar PT [facility]

## 2024-08-29 ENCOUNTER — HOSPITAL ENCOUNTER (OUTPATIENT)
Facility: HOSPITAL | Age: 84
Setting detail: RECURRING SERIES
End: 2024-08-29
Payer: MEDICARE

## 2024-08-29 PROCEDURE — 97530 THERAPEUTIC ACTIVITIES: CPT

## 2024-08-29 PROCEDURE — 97110 THERAPEUTIC EXERCISES: CPT

## 2024-08-29 NOTE — PROGRESS NOTES
PHYSICAL / OCCUPATIONAL THERAPY - DAILY TREATMENT NOTE    Patient Name: Harriett Powers    Date: 2024    : 1940  Insurance: Payor: MEDICARE / Plan: MEDICARE PART A AND B / Product Type: *No Product type* /      Patient  verified Yes     Visit #   Current / Total 10 24   Time   In / Out 11:20 11:59   Pain   In / Out 0/10 0/10   Subjective Functional Status/Changes: Pt. Reports she is feeling pretty good today. She reports exercises last time went ok.      TREATMENT AREA =  Right knee pain [M25.561]  Left knee pain [M25.562]  Other low back pain [M54.59]     OBJECTIVE    Therapeutic Procedures:    Tx Min Billable or 1:1 Min (if diff from Tx Min) Procedure, Rationale, Specifics   31  36524 Therapeutic Exercise (timed):  increase ROM, strength, coordination, balance, and proprioception to improve patient's ability to progress to PLOF and address remaining functional goals. (see flow sheet as applicable)     Details if applicable:  see flow sheet     8  98439 Therapeutic Activity (timed):  use of dynamic activities replicating functional movements to increase ROM, strength, coordination, balance, and proprioception in order to improve patient's ability to progress to PLOF and address remaining functional goals.  (see flow sheet as applicable)     Details if applicable:  step ups, eccentric step downs          Details if applicable:            Details if applicable:            Details if applicable:     39  [facility] Totals Reminder: bill using total billable min of TIMED therapeutic procedures (example: do not include dry needle or estim unattended, both untimed codes, in totals to left)  8-22 min = 1 unit; 23-37 min = 2 units; 38-52 min = 3 units; 53-67 min = 4 units; 68-82 min = 5 units   Total Total     [x]  Patient Education billed concurrently with other procedures   [x] Review HEP    [] Progressed/Changed HEP, detail:    [] Other detail:       Objective Information/Functional Measures/Assessment    Poor  progress note: progressing, B hip ext MMT 4/5     4.   Goal: Patient will improve 30 second sit to stand test to 10x in order to increase ease of transfers at home.   Status at last progress note: progressing, completing 9 reps     5.   Goal: Patient will improve B single leg balance to 10 seconds in order to improve stability during ambulation.   Status at last progress note: progressing, R= 6\" and L= 7\"        Next PN 9/21/24/ RC due 10/24/24  Auth due (visit number/ date) DAWSON    PLAN  - Continue Plan of Care    David Aguilar PT    8/29/2024    7:36 AM  If an interpreting service was utilized for treatment of this patient, the contents of this document represent the material reviewed with the patient via the .     Future Appointments   Date Time Provider Department Center   8/29/2024 11:20 AM David Aguilar PT [facility]   9/3/2024  9:20 AM Loree Gilliam PTA [facility]   9/6/2024 10:00 AM Loree Gilliam PTA [facility]   9/10/2024 10:00 AM Loree Gilliam PTA [facility]   9/12/2024 10:00 AM Loree Gilliam PTA [facility]   9/17/2024 10:00 AM Loree Gilliam PTA [facility]   9/19/2024 10:40 AM David Aguilar PT [facility]

## 2024-09-03 ENCOUNTER — HOSPITAL ENCOUNTER (OUTPATIENT)
Facility: HOSPITAL | Age: 84
Setting detail: RECURRING SERIES
Discharge: HOME | End: 2024-09-06
Payer: MEDICARE

## 2024-09-03 PROCEDURE — 97110 THERAPEUTIC EXERCISES: CPT

## 2024-09-03 PROCEDURE — 97112 NEUROMUSCULAR REEDUCATION: CPT

## 2024-09-03 PROCEDURE — 97530 THERAPEUTIC ACTIVITIES: CPT

## 2024-09-03 NOTE — PROGRESS NOTES
PHYSICAL / OCCUPATIONAL THERAPY - DAILY TREATMENT NOTE    Patient Name: Harriett Powers    Date: 9/3/2024    : 1940  Insurance: Payor: MEDICARE / Plan: MEDICARE PART A AND B / Product Type: *No Product type* /      Patient  verified Yes     Visit #   Current / Total 11 24   Time   In / Out 919 957   Pain   In / Out 0/10 0/10   Subjective Functional Status/Changes: Pt stated that she has no pain, just stiffness     TREATMENT AREA =  Right knee pain [M25.561]  Left knee pain [M25.562]  Other low back pain [M54.59]     OBJECTIVE    838     Therapeutic Procedures:    Tx Min Billable or 1:1 Min (if diff from Tx Min) Procedure, Rationale, Specifics   15  66713 Therapeutic Exercise (timed):  increase ROM, strength, coordination, balance, and proprioception to improve patient's ability to progress to PLOF and address remaining functional goals. (see flow sheet as applicable)     Details if applicable:       15  38778 Neuromuscular Re-Education (timed):  improve balance, coordination, kinesthetic sense, posture, core stability and proprioception to improve patient's ability to develop conscious control of individual muscles and awareness of position of extremities in order to progress to PLOF and address remaining functional goals. (see flow sheet as applicable)     Details if applicable:     8  65661 Therapeutic Activity (timed):  use of dynamic activities replicating functional movements to increase ROM, strength, coordination, balance, and proprioception in order to improve patient's ability to progress to PLOF and address remaining functional goals.  (see flow sheet as applicable)     Details if applicable:     38  [facility] Totals Reminder: bill using total billable min of TIMED therapeutic procedures (example: do not include dry needle or estim unattended, both untimed codes, in totals to left)  8-22 min = 1 unit; 23-37 min = 2 units; 38-52 min = 3 units; 53-67 min = 4 units; 68-82 min = 5 units   Total Total

## 2024-09-06 ENCOUNTER — HOSPITAL ENCOUNTER (OUTPATIENT)
Facility: HOSPITAL | Age: 84
Setting detail: RECURRING SERIES
Discharge: HOME | End: 2024-09-09
Payer: MEDICARE

## 2024-09-06 PROCEDURE — 97530 THERAPEUTIC ACTIVITIES: CPT

## 2024-09-06 PROCEDURE — 97110 THERAPEUTIC EXERCISES: CPT

## 2024-09-06 PROCEDURE — 97112 NEUROMUSCULAR REEDUCATION: CPT

## 2024-09-10 ENCOUNTER — HOSPITAL ENCOUNTER (OUTPATIENT)
Facility: HOSPITAL | Age: 84
Setting detail: RECURRING SERIES
Discharge: HOME | End: 2024-09-13
Payer: MEDICARE

## 2024-09-10 PROCEDURE — 97112 NEUROMUSCULAR REEDUCATION: CPT

## 2024-09-10 PROCEDURE — 97110 THERAPEUTIC EXERCISES: CPT

## 2024-09-10 PROCEDURE — 97530 THERAPEUTIC ACTIVITIES: CPT

## 2024-09-12 ENCOUNTER — HOSPITAL ENCOUNTER (OUTPATIENT)
Facility: HOSPITAL | Age: 84
Setting detail: RECURRING SERIES
Discharge: HOME | End: 2024-09-15
Payer: MEDICARE

## 2024-09-12 PROCEDURE — 97110 THERAPEUTIC EXERCISES: CPT

## 2024-09-12 PROCEDURE — 97530 THERAPEUTIC ACTIVITIES: CPT

## 2024-09-12 PROCEDURE — 97112 NEUROMUSCULAR REEDUCATION: CPT

## 2024-09-17 ENCOUNTER — HOSPITAL ENCOUNTER (OUTPATIENT)
Facility: HOSPITAL | Age: 84
Setting detail: RECURRING SERIES
Discharge: HOME | End: 2024-09-20
Payer: MEDICARE

## 2024-09-17 PROCEDURE — 97112 NEUROMUSCULAR REEDUCATION: CPT

## 2024-09-17 PROCEDURE — 97530 THERAPEUTIC ACTIVITIES: CPT

## 2024-09-19 ENCOUNTER — HOSPITAL ENCOUNTER (OUTPATIENT)
Facility: HOSPITAL | Age: 84
Setting detail: RECURRING SERIES
Discharge: HOME | End: 2024-09-22
Payer: MEDICARE

## 2024-09-19 PROCEDURE — 97535 SELF CARE MNGMENT TRAINING: CPT

## 2024-09-19 PROCEDURE — 97530 THERAPEUTIC ACTIVITIES: CPT
